# Patient Record
Sex: MALE | Race: WHITE | Employment: FULL TIME | URBAN - METROPOLITAN AREA
[De-identification: names, ages, dates, MRNs, and addresses within clinical notes are randomized per-mention and may not be internally consistent; named-entity substitution may affect disease eponyms.]

---

## 2017-07-25 ENCOUNTER — HOSPITAL ENCOUNTER (OUTPATIENT)
Dept: LAB | Age: 42
Discharge: HOME | End: 2017-07-25

## 2018-03-05 ENCOUNTER — TELEPHONE (OUTPATIENT)
Dept: DERMATOLOGY | Facility: AMBULATORY SURGERY CENTER | Age: 43
End: 2018-03-05

## 2018-03-19 ENCOUNTER — OFFICE VISIT (OUTPATIENT)
Dept: DERMATOLOGY | Facility: AMBULATORY SURGERY CENTER | Age: 43
End: 2018-03-19

## 2018-03-19 VITALS
BODY MASS INDEX: 27.32 KG/M2 | DIASTOLIC BLOOD PRESSURE: 80 MMHG | TEMPERATURE: 98.8 F | WEIGHT: 170 LBS | SYSTOLIC BLOOD PRESSURE: 116 MMHG | OXYGEN SATURATION: 98 % | HEART RATE: 59 BPM | HEIGHT: 66 IN | RESPIRATION RATE: 16 BRPM

## 2018-03-19 DIAGNOSIS — C44.319 BASAL CELL CARCINOMA OF CHIN: Primary | ICD-10-CM

## 2018-03-19 NOTE — MR AVS SNAPSHOT
[address] [facility] [address] 
[phone] Patient: Ángel Duff MRN: [id number] :1975 Visit Information Date & Time Provider Department Dept. Phone Encounter #  
 3/19/2018  1:30 PM MD Angie Sosa [id number]  Upcoming Health Maintenance Date Due DTaP/Tdap/Td series (1 - Tdap) 1995 Influenza Age 5 to Adult 2017 Allergies as of 3/19/2018  Review Complete On: 3/19/2018 By: Carly Verdugo LPN Severity Noted Reaction Type Reactions Percocet [Oxycodone-acetaminophen] Medium 2015   Side Effect Itching Current Immunizations  Never Reviewed Name Date Influenza Vaccine 2014 Not reviewed this visit Vitals BP Pulse Temp Resp Height(growth percentile) Weight(growth percentile) 116/80 (BP 1 Location: Right arm, BP Patient Position: Sitting) (!) 59 98.8 °F (37.1 °C) 16 5' 6\" (1.676 m) 170 lb (77.1 kg) SpO2 BMI Smoking Status 98% 27.44 kg/m2 Never Smoker BMI and BSA Data Body Mass Index Body Surface Area  
 27.44 kg/m 2 1.89 m 2 Your Updated Medication List  
  
   
This list is accurate as of 3/19/18  2:15 PM.  Always use your most recent med list.  
  
  
  
  
 atovaquone-proguanil 250-100 mg per tablet Commonly known as:  MALARONE Take 1 Tab by mouth daily. Prophy. For [facility] trip: started  but did not complete 13d regimen CIPRO 500 mg tablet Generic drug:  ciprofloxacin HCl Take 500 mg by mouth two (2) times a day. ibuprofen 200 mg tablet Commonly known as:  MOTRIN Take 400 mg by mouth every six (6) hours as needed for Pain. RITALIN 10 mg tablet Generic drug:  methylphenidate HCl Take 1-2 mg by mouth two (2) times a day. 20mg qam and 10mg afternoon  (takes when needed when not working - takes standing freq. When working) Patient Instructions WOUND CARE INSTRUCTIONS 1. Keep the dressing clean and dry and do not remove for 48 hours. 2. Then change the dressing once a day as follows: 
a. Wash hands before and after each dressing change. b. Remove dressing and wash site gently with mild soap and water, rinse, and pat dry. 
c. Apply an ointment (Bacitracin, Polysporin, Neosporin, Petroleum jelly or Aquaphor). d. Apply a non-stick (Telfa) dressing or Band-Aid to cover the wound. Remove pressure bandage on wednesday, then wash gently and apply a thin layer Vaseline and a band-aid to site daily for 1 week. 3. Watch for: BLEEDING: A small amount of drainage may occur. If bleeding occurs, elevate and rest the surgery site. Apply gauze and steady pressure for 15 minutes. If bleeding continues, call this office. INFECTION: Signs of infection include increased redness, pain, warmth, drainage of pus, and fever. If this occurs, call this office. 4. Special Instructions (follow any that are checked): 
· [x] You have stitches that DO NOT need to be removed. · [x] Avoid bending at the waist and heavy lifting for two days. · [x] Sleep with your head elevated for the next two nights. · [x] Rest the surgery site and keep it elevated as much as possible for two days. · [] You may apply an ice-pack for 10-15 minutes every waking hour for the rest of the day. · [] Eat a soft diet and avoid hot food and hot drinks for the rest of the day. · [] Other instructions: Follow up as directed. Take Tylenol or Ibuprofen for pain as needed. Once the site is healed with no remaining bandages or open areas, protect your surgical site and scar from the sun, as this area will be more sensitive. Use a broad spectrum sunscreen SPF 30 or higher daily, and a chemical free product (one containing zinc oxide or titanium dioxide) is a good choice if the area is sensitive. You may begin to gently massage the surgical site in 2-3 weeks, rubbing in a circular motion along the scar. This can help reduce swelling and thickness of a scar. A scar cream may be used beginnning 1 month after the surgery. If you have any questions or concerns, please call our office Monday through Friday at [phone]. Introducing [facility] SERVICES! Natan Johnson introduces [employer] patient portal. Now you can access parts of your medical record, email your doctor's office, and request medication refills online. 1. In your internet browser, go to [email or web address]. [employer] [facility] 2. Click on the First Time User? Click Here link in the Sign In box. You will see the New Member Sign Up page. 3. Enter your [employer] Access Code exactly as it appears below. You will not need to use this code after youve completed the sign-up process. If you do not sign up before the expiration date, you must request a new code. · [employer] Access Code: [email or web address] Expires: 5/27/2018 12:10 PM 
 
4. Enter the last four digits of your Social Security Number (xxxx) and Date of Birth (mm/dd/yyyy) as indicated and click Submit. You will be taken to the next sign-up page. 5. Create a [employer] ID. This will be your [employer] login ID and cannot be changed, so think of one that is secure and easy to remember. 6. Create a [employer] password. You can change your password at any time. 7. Enter your Password Reset Question and Answer. This can be used at a later time if you forget your password. 8. Enter your e-mail address. You will receive e-mail notification when new information is available in [address] 9. Click Sign Up. You can now view and download portions of your medical record. 10. Click the Download Summary menu link to download a portable copy of your medical information.  
 
If you have questions, please visit the Frequently Asked Questions section of the [facility]. Remember, [facility] is NOT to be used for urgent needs. For medical emergencies, dial 911. Now available from your iPhone and Android! Please provide this summary of care documentation to your next provider. Your primary care clinician is listed as Phys Other. If you have any questions after today's visit, please call [phone].

## 2018-03-19 NOTE — PROGRESS NOTES
This note is written by Kulwinder Dawkins, as dictated by Mar Greene. Dominique Barfield MD.    CC: Basal cell carcinoma of the left chin     History of present illness:     Jose Moser is a 36 y.o. male referred by Dr. Chavo George. He has a biopsy-proven nodular basal cell carcinoma on the left chin. This is a new nodular basal cell carcinoma present for less than 6 months before biopsy in July 2017, described as an area of concern by Spike Hardy with no prior treatment. Biopsy confirmed the diagnosis of nodular basal cell carcinoma, and I reviewed the written pathology report. He is feeling well and in his usual state of health today. He has no pain, no current illnesses, no other skin concerns. His allergies, medications, medical, and social history are reviewed by me today. Exam:     He is an awake, alert, and oriented 36 y.o. male who appears well and in no distress. There is no preauricular, submandibular, or cervical lymphadenopathy. I examined his chin. He has a 4 x 4 mm pink scar with pearly central area on his left chin. He confirms location. Assessment/plan:    1. Basal cell carcinoma, left chin. I discussed the diagnosis of basal cell carcinoma and summarized the pathology report. Mohs surgery is indicated by site. The procedure was discussed, verbal and written consent were obtained. I performed the procedure. One stage was required to reach a tumor free plane. The surgical defect was managed with an intermediate repair. There were no complications. He will follow up as needed as the site heals. 2. History of basal cell carcinoma. I discussed the diagnosis and recommend routine examinations with Dr. Chavo George for surveillance. Indications, risks, and options were discussed with Jose Moser preoperatively. Risks including, but not limited to: pain, bleeding, infection, tumor recurrence, scarring and damage to motor and/or sensory nerves, were discussed. Jose Moser chose Mohs surgery.  Althea Nestor Wardgo was an acceptable surgery candidate. Juliet Graff was placed in the appropriate position on the operating table in the Mohs surgery procedure room. The area was prepped and draped in the standard manner. Gentian violet was used to outline the clinical margins of the tumor. Local anesthesia was then obtained. The grossly visible tumor was then removed, an underlying layer was excised and mapped according to the Mohs technique, and the individual specimens examined microscopically. The process was repeated until microscopic examination of the tissue specimens confirmed a tumor-free plane. Hemostasis was obtained with electrosurgery and pressure. The wound was covered between stages with moist saline gauze. Wound care instructions (written and verbal) and a follow up appointment were given to Juliet Graff before discharge. Juliet Graff was discharged in good condition. The wound management options of second intent healing, layered closure, local  flap, and/or full thickness skin graft were discussed. Juliet Graff understands the   aims, risks, alternatives, and possible complications and elects to proceed with an   intermediate layered closure. Wound margins were made vertical, edges undermined   in the subcutaneous plane, standing cones corrected at both poles followed by layered closure. The wound was closed with buried 5-0 polysorb suture in the subcutis to reduce tension on the skin edges, and skin edges were approximated with 6-0 fast-gut suture in the dermis to reduce tension on the epidermis. The final closure length was 14 mm. The wound was bandaged with vaseline, gauze and Coverroll. Wound care   instructions (written and/or verbal) and a follow up appointment were given to Juliet Graff before discharge. Juliet Graff was discharged in good condition.                   The documentation recorded by the scribe accurately reflects the service I personally performed and the decisions made by me. [facility]   OFFICE PROCEDURE PROGRESS NOTE     Chart reviewed for the following:     Nickolas Badillo MD, have reviewed the History, Physical and updated the Allergic reactions for [place] [address] performed immediately prior to start of procedure:     Nickolas Badillo MD, have performed the following reviews on [facility] ([facility]) prior to the start of the procedure:     * Patient was identified by name and date of birth   * Agreement on procedure being performed was verified   * Risks and Benefits explained to the patient   * Procedure site verified and marked as necessary   * Patient was positioned for comfort   * Consent was signed and verified     Time: 2:05 PM EST  Date of procedure: 3/19/2018  Procedure performed by: Pari Huber. Martha Badillo MD   Provider assisted by: Yunior Chaparro LPN   Patient assisted by: self   How tolerated by patient: tolerated the procedure well with no complications   Comments: none        .

## 2018-03-19 NOTE — PATIENT INSTRUCTIONS
WOUND CARE INSTRUCTIONS    1. Keep the dressing clean and dry and do not remove for 48 hours. 2. Then change the dressing once a day as follows:  a. Wash hands before and after each dressing change. b. Remove dressing and wash site gently with mild soap and water, rinse, and pat dry.  c. Apply an ointment (Bacitracin, Polysporin, Neosporin, Petroleum jelly or Aquaphor). d. Apply a non-stick (Telfa) dressing or Band-Aid to cover the wound. Remove pressure bandage on wednesday, then wash gently and apply a thin layer Vaseline and a band-aid to site daily for 1 week. 3. Watch for:  BLEEDING: A small amount of drainage may occur. If bleeding occurs, elevate and rest the surgery site. Apply gauze and steady pressure for 15 minutes. If bleeding continues, call this office. INFECTION: Signs of infection include increased redness, pain, warmth, drainage of pus, and fever. If this occurs, call this office. 4. Special Instructions (follow any that are checked):  · [x] You have stitches that DO NOT need to be removed. · [x] Avoid bending at the waist and heavy lifting for two days. · [x] Sleep with your head elevated for the next two nights. · [x] Rest the surgery site and keep it elevated as much as possible for two days. · [] You may apply an ice-pack for 10-15 minutes every waking hour for the rest of the day. · [] Eat a soft diet and avoid hot food and hot drinks for the rest of the day. · [] Other instructions: Follow up as directed. Take Tylenol or Ibuprofen for pain as needed. Once the site is healed with no remaining bandages or open areas, protect your surgical site and scar from the sun, as this area will be more sensitive. Use a broad spectrum sunscreen SPF 30 or higher daily, and a chemical free product (one containing zinc oxide or titanium dioxide) is a good choice if the area is sensitive.     You may begin to gently massage the surgical site in 2-3 weeks, rubbing in a circular motion along the scar. This can help reduce swelling and thickness of a scar. A scar cream may be used beginnning 1 month after the surgery. If you have any questions or concerns, please call our office Monday through Friday at [phone].